# Patient Record
(demographics unavailable — no encounter records)

---

## 2025-05-12 NOTE — PROCEDURE
[FreeTextEntry3] : Large joint injection was performed on the __LEFT_ knee. The indication for this procedure was pain, inflammation, and x-ray evidence of Osteoarthritis on this or prior visit. The site was prepped with betadine, ethyl chloride sprayed topically, and sterile technique used. An injection of Lidocaine 3cc of 1%, Bupivacaine (Marcaine) 5cc of 0.25%, Methylprednisolone (Depomedrol) cc of 80 mg was used. Patient was advised to call if redness, pain, or fever occur, apply ice for 15 minutes out of every hour for the next 12-24 hours as tolerated and patient was advised to rest the joint(s) for days. Patient has tried OTC's including aspirin, Ibuprofen, Aleve, etc. or prescription NSAIDS, and/or exercises at home and/or physical therapy without satisfactory response and patient had decreased mobility in the joint. Ultrasound guidance was indicated for this patient due to better visualize joint space. All ultrasound images have been permanently captured and stored accordingly in our picture.

## 2025-05-12 NOTE — IMAGING
[de-identified] : LEFT KNEE mild effusion medial joint line tenderness ROM 5-105 +1 pt pulses sensation intact lig stable 5/5 strength +3 edema bilaterally sensation intact no pain with hip ROM  XR LT KNEE 3 views (5/12/25) showing moderate OA- KL 3

## 2025-05-12 NOTE — ASSESSMENT
[FreeTextEntry1] : 5/12/25: Pt with moderate LT knee OA. Discussed conservative vs surgical treatment options- risks and benefits explained. Has not had any formal tx thus far. She is well informed and would like to proceed with LT knee csi today- tolerated well. I am also recommending the use of Meloxicam 15mg daily and discussed the risks and benefits of the medication, patient elects to proceed with this. Defers PT for now- will cont with HEP. F/up as symptoms dictate

## 2025-05-12 NOTE — HISTORY OF PRESENT ILLNESS
[de-identified] : 5/12/25: 83F here for LT knee pain x 6 months. No specific injury/trauma. Most pain anteriorly and medially. Worse with stairs and prolonged sit to stand. Occ feels difficulty with ROM. Aleve as needed with good relief. Occ topical creams. No formal tx thus far. Denies N/T. Feels she is using her RT knee to compensate for LT knee pain. Ambulates without assistance.  [] : no [FreeTextEntry5] : left knee pain for six months, No injury. Having anterior and medial pain. Using nsaids for pain. Having a snapping sensation.

## 2025-05-12 NOTE — DISCUSSION/SUMMARY
[de-identified] : The patient was advised of the diagnosis.  The natural history of the pathology was explained in full to the patient in layman's terms. All questions were answered.  The risks and benefits of surgical and non-surgical treatment alternatives were explained in full to the patient.  The natural progression of Osteoarthritis was explained to the patient.  We discussed the possible treatment options from conservative to operative.  These included NSAIDS, Glucosamine and Chondrotin sulfate, and Physical Therapy as well different types of injections.  We also discussed that at some point they may progress to needed a TKA.  Information and pamphlets were given when appropriate.  The risks, benefits, contents and alternatives to injection were explained in full to the patient.  Risks outlined include but are not limited to infection, sepsis, bleeding, scarring, skin discoloration, temporary increase in pain, syncopal episode, failure to resolve symptoms, allergic reaction, flare reaction, permanent white skin discoloration, symptom recurrence, and elevation of blood sugar in diabetics.  Patient understood the risks.  All questions were answered.  After discussion of options, patient requested an injection.  Oral informed consent was obtained and sterile prep was done of the injection site.  Sterile technique was used to introduce the mixture.  Patient tolerated the procedure well.  Patient advised to ice the injection site this evening.  Signs and symptoms of infection reviewed and patient advised to call immediately for redness, fevers, and/or chills.  Entered by Unique Horton acting as a scribe.